# Patient Record
Sex: MALE | Race: WHITE
[De-identification: names, ages, dates, MRNs, and addresses within clinical notes are randomized per-mention and may not be internally consistent; named-entity substitution may affect disease eponyms.]

---

## 2018-07-18 ENCOUNTER — HOSPITAL ENCOUNTER (OUTPATIENT)
Dept: HOSPITAL 74 - JASU-SURG | Age: 83
Discharge: HOME | End: 2018-07-18
Attending: OPHTHALMOLOGY
Payer: MEDICARE

## 2018-07-18 VITALS — SYSTOLIC BLOOD PRESSURE: 115 MMHG | DIASTOLIC BLOOD PRESSURE: 57 MMHG | HEART RATE: 73 BPM

## 2018-07-18 VITALS — TEMPERATURE: 97.8 F

## 2018-07-18 VITALS — BODY MASS INDEX: 23 KG/M2

## 2018-07-18 DIAGNOSIS — H26.9: Primary | ICD-10-CM

## 2018-07-18 DIAGNOSIS — H40.20X0: ICD-10-CM

## 2018-07-18 DIAGNOSIS — Z79.84: ICD-10-CM

## 2018-07-18 DIAGNOSIS — E11.9: ICD-10-CM

## 2018-07-18 DIAGNOSIS — H21.81: ICD-10-CM

## 2018-07-18 PROCEDURE — C9447 INJ, PHENYLEPHRINE KETOROLAC: HCPCS

## 2018-07-18 PROCEDURE — 08RJ3JZ REPLACEMENT OF RIGHT LENS WITH SYNTHETIC SUBSTITUTE, PERCUTANEOUS APPROACH: ICD-10-PCS | Performed by: OPHTHALMOLOGY

## 2018-07-18 NOTE — OP
DATE OF OPERATION:  07/18/2018 

 

PREOPERATIVE DIAGNOSIS:  Cataract, right eye.

 

POSTOPERATIVE DIAGNOSES:  

1.  Cataract, right eye.

2.  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  

 

DESCRIPTION OF PROCEDURE:  Patient was brought into the operating room and correctly

identified along with the operative site as well as correct intraocular lens power. 

He was then prepped and draped in the usual sterile fashion including 5% Betadine

solution in the conjunctival sac and an eyelid drape.  An eyelid speculum was then

placed into the right eye.  A paracentesis port was created, and 0.5 mL of lidocaine

1% preservative free was injected intracamerally.  Beneath an air bubble, the capsule

was then stained with trypan blue and flushed with the remaining 0.5 mL of lidocaine

1%.  Viscoelastic was placed through the paracentesis to inflate the anterior

chamber; however, Viscoelastic was immediately noted to reflux from the paracentesis

port.  The eye was palpated with a cannula, and intraocular pressure was noted to be

extremely elevated.  An attempt was made to lift the iris in an attempt to decrease

the pressure; however, this did not work.  The Viscoelastic was then manually removed

using the cannula.  Intravenous mannitol was given as well.  The _____ was then

broken.  Decision then was made to place iris hooks.  Five additional paracentesis

ports were created, and iris hooks were placed to dilate the pupil mechanically.  A

temporal clear corneal wound was created.  The anterior chamber was noted to be very

shallow; however, the Viscoelastic was noted to retain in the eye.  A continuous

circular capsulorrhexis was successfully performed and the nucleus gently dissected

with care being not to place too much fluid within the eye.  The nucleus was then

removed with phacoemulsification via the divide-and-conquer approach and remaining

cortical material irrigated and aspirated from the eye.  Viscoelastic was injected to

inflate the anterior chamber and capsular bag.  The lens was injected in the capsular

bag.  The iris hooks were then removed from the eye, and the incisions were stromal

hydrated with BSS.  The Viscoelastic was then irrigated and aspirated from the eye

without complication.  At the end of the procedure, the wounds were tested, and there

was a small amount of leakage noted at the temporal clear cornea wound.  A single

10-0 nylon suture was placed.  Again the wounds were checked and the anterior chamber

re-formed.  This time no leak was noted.  The anterior chamber was noted to be

stable.  The intraocular lens was well centered and covered by the anterior chamber

border.  Topical vancomycin given, eye patched and shielded, and the patient

discharged from the operating room in stable condition.  

 

 

DORIS TYSON M.D.

 

[id number]

DD: 07/18/2018 09:28

DT: 07/18/2018 10:10

Job #:  [id number]

## 2018-08-08 ENCOUNTER — HOSPITAL ENCOUNTER (OUTPATIENT)
Dept: HOSPITAL 74 - JASU-SURG | Age: 83
Discharge: HOME | End: 2018-08-08
Attending: OPHTHALMOLOGY
Payer: MEDICARE

## 2018-08-08 VITALS — DIASTOLIC BLOOD PRESSURE: 55 MMHG | HEART RATE: 80 BPM | SYSTOLIC BLOOD PRESSURE: 109 MMHG

## 2018-08-08 VITALS — TEMPERATURE: 98 F

## 2018-08-08 VITALS — BODY MASS INDEX: 23 KG/M2

## 2018-08-08 DIAGNOSIS — J44.9: ICD-10-CM

## 2018-08-08 DIAGNOSIS — E11.9: ICD-10-CM

## 2018-08-08 DIAGNOSIS — H57.03: ICD-10-CM

## 2018-08-08 DIAGNOSIS — Z79.84: ICD-10-CM

## 2018-08-08 DIAGNOSIS — H26.9: Primary | ICD-10-CM

## 2018-08-08 PROCEDURE — 08RK3JZ REPLACEMENT OF LEFT LENS WITH SYNTHETIC SUBSTITUTE, PERCUTANEOUS APPROACH: ICD-10-PCS | Performed by: OPHTHALMOLOGY

## 2018-08-08 RX ADMIN — TROPICAMIDE SCH DROP: 10 SOLUTION/ DROPS OPHTHALMIC at 07:30

## 2018-08-08 RX ADMIN — PHENYLEPHRINE HYDROCHLORIDE SCH DROP: 0.25 SPRAY NASAL at 07:35

## 2018-08-08 RX ADMIN — OFLOXACIN SCH DROP: 3 SOLUTION/ DROPS OPHTHALMIC at 07:40

## 2018-08-08 RX ADMIN — OFLOXACIN SCH DROP: 3 SOLUTION/ DROPS OPHTHALMIC at 07:30

## 2018-08-08 RX ADMIN — TROPICAMIDE SCH DROP: 10 SOLUTION/ DROPS OPHTHALMIC at 07:35

## 2018-08-08 RX ADMIN — PHENYLEPHRINE HYDROCHLORIDE SCH DROP: 0.25 SPRAY NASAL at 07:40

## 2018-08-08 RX ADMIN — CYCLOPENTOLATE HYDROCHLORIDE SCH DROP: 10 SOLUTION/ DROPS OPHTHALMIC at 07:35

## 2018-08-08 RX ADMIN — OFLOXACIN SCH DROP: 3 SOLUTION/ DROPS OPHTHALMIC at 07:35

## 2018-08-08 RX ADMIN — CYCLOPENTOLATE HYDROCHLORIDE SCH DROP: 10 SOLUTION/ DROPS OPHTHALMIC at 07:30

## 2018-08-08 RX ADMIN — TROPICAMIDE SCH DROP: 10 SOLUTION/ DROPS OPHTHALMIC at 07:40

## 2018-08-08 RX ADMIN — PHENYLEPHRINE HYDROCHLORIDE SCH DROP: 0.25 SPRAY NASAL at 07:30

## 2018-08-08 RX ADMIN — CYCLOPENTOLATE HYDROCHLORIDE SCH DROP: 10 SOLUTION/ DROPS OPHTHALMIC at 07:40

## 2018-08-08 NOTE — OP
DATE OF OPERATION:  08/08/2018

 

SURGEON:  Doris Tyson M.D.

 

PREOPERATIVE DIAGNOSIS:  Cataract, left eye.

 

ASSOCIATED DIAGNOSES:  

1.  Persistent miosis.  

2.  Intraoperative floppy iris syndrome.

 

OPERATION:  Phacoemulsification of left cataract with capsular staining with Trypan

blue and iris hooks.  Lens used [id number], 25.0 diopter power, serial No. [id number].

 

ANESTHESIA:  Topical MAC. 

 

COMPLICATIONS:  None.

 

PROCEDURE:  The patient was brought into the operating room and correctly identified

along with the operative site as well as correct intraocular lens mc.  He was

then prepped and draped in the usual sterile fashion including 5% Betadine solution

in the conjunctival sac and an eyelid drape.  An eyelid speculum was then placed into

the left eye.  The eye was inspected, and a 4.5-mm pupil was noted.  A paracentesis

port was created and intracameral lidocaine 1% 0.5 mL was injected intracamerally. 

The capsule was then stained with Trypan blue by injecting Trypan blue directly into

the anterior chamber.  This was then irrigated with the remainder 0.5 mL of the

preservative-free 1% lidocaine.  Viscoelastic was injected to inflate the anterior

chamber.  The pupil was not noted to expand significantly.  Four additional

paracentesis ports were created, and the temporal clear corneal wound created.  Iris

hooks were placed to expand the pupil.  The capsule was further stained beneath the

viscoelastic with Trypan blue.  A continuous circular capsulorrhexis was performed. 

The nucleus was hydro-dissected with BSS and removed with phacoemulsification.  The

remaining cortical material was irrigated and aspirated from the eye.  The

viscoelastic was injected to inflate the capsular bag.  The lens was injected into

the capsular bag.  The iris hooks were then removed from the eye without

complication, and the wounds were stromal hydrated with BSS.  Viscoelastic was then

irrigated and aspirated from the eye.  Without the hooks, iris was noted to flop.  At

the end of the procedure, all wounds were tested and found to be watertight.  No

suture was placed.  The intraocular lens was noted to be well centered and covered by

the anterior capsular border and to chamber deep.  Topical Vancomycin given, the eye

patched and shielded, and the patient was discharged from the operating room in a

stable condition.

 

 

DORIS TYSON M.D.

 

[id number]

DD: 08/08/2018 09:01

DT: 08/08/2018 10:01

Job #:  [id number]